# Patient Record
Sex: MALE | Race: OTHER | HISPANIC OR LATINO | Employment: FULL TIME | URBAN - NONMETROPOLITAN AREA
[De-identification: names, ages, dates, MRNs, and addresses within clinical notes are randomized per-mention and may not be internally consistent; named-entity substitution may affect disease eponyms.]

---

## 2021-10-12 ENCOUNTER — EVALUATION (OUTPATIENT)
Dept: PHYSICAL THERAPY | Facility: CLINIC | Age: 41
End: 2021-10-12
Payer: COMMERCIAL

## 2021-10-12 DIAGNOSIS — S66.320D LACERATION OF EXTENSOR MUSCLE, FASCIA AND TENDON OF RIGHT INDEX FINGER AT WRIST AND HAND LEVEL, SUBSEQUENT ENCOUNTER: Primary | ICD-10-CM

## 2021-10-12 PROCEDURE — 97112 NEUROMUSCULAR REEDUCATION: CPT | Performed by: PHYSICAL THERAPIST

## 2021-10-12 PROCEDURE — 97140 MANUAL THERAPY 1/> REGIONS: CPT | Performed by: PHYSICAL THERAPIST

## 2021-10-12 PROCEDURE — 97162 PT EVAL MOD COMPLEX 30 MIN: CPT | Performed by: PHYSICAL THERAPIST

## 2021-10-12 PROCEDURE — L3808 WHFO, RIGID W/O JOINTS: HCPCS | Performed by: PHYSICAL THERAPIST

## 2021-10-14 ENCOUNTER — APPOINTMENT (OUTPATIENT)
Dept: PHYSICAL THERAPY | Facility: CLINIC | Age: 41
End: 2021-10-14
Payer: COMMERCIAL

## 2021-10-18 ENCOUNTER — APPOINTMENT (OUTPATIENT)
Dept: PHYSICAL THERAPY | Facility: CLINIC | Age: 41
End: 2021-10-18
Payer: COMMERCIAL

## 2021-10-19 ENCOUNTER — OFFICE VISIT (OUTPATIENT)
Dept: PHYSICAL THERAPY | Facility: CLINIC | Age: 41
End: 2021-10-19
Payer: COMMERCIAL

## 2021-10-19 DIAGNOSIS — S66.320D LACERATION OF EXTENSOR MUSCLE, FASCIA AND TENDON OF RIGHT INDEX FINGER AT WRIST AND HAND LEVEL, SUBSEQUENT ENCOUNTER: Primary | ICD-10-CM

## 2021-10-19 PROCEDURE — 97110 THERAPEUTIC EXERCISES: CPT | Performed by: PHYSICAL THERAPIST

## 2021-10-19 PROCEDURE — 97112 NEUROMUSCULAR REEDUCATION: CPT | Performed by: PHYSICAL THERAPIST

## 2021-10-19 PROCEDURE — 97140 MANUAL THERAPY 1/> REGIONS: CPT | Performed by: PHYSICAL THERAPIST

## 2021-10-20 ENCOUNTER — OFFICE VISIT (OUTPATIENT)
Dept: PHYSICAL THERAPY | Facility: CLINIC | Age: 41
End: 2021-10-20
Payer: COMMERCIAL

## 2021-10-20 DIAGNOSIS — S66.320D LACERATION OF EXTENSOR MUSCLE, FASCIA AND TENDON OF RIGHT INDEX FINGER AT WRIST AND HAND LEVEL, SUBSEQUENT ENCOUNTER: Primary | ICD-10-CM

## 2021-10-20 PROCEDURE — 97112 NEUROMUSCULAR REEDUCATION: CPT | Performed by: PHYSICAL THERAPIST

## 2021-10-20 PROCEDURE — 97110 THERAPEUTIC EXERCISES: CPT | Performed by: PHYSICAL THERAPIST

## 2021-10-20 PROCEDURE — 97140 MANUAL THERAPY 1/> REGIONS: CPT | Performed by: PHYSICAL THERAPIST

## 2021-10-25 ENCOUNTER — OFFICE VISIT (OUTPATIENT)
Dept: PHYSICAL THERAPY | Facility: CLINIC | Age: 41
End: 2021-10-25
Payer: COMMERCIAL

## 2021-10-25 DIAGNOSIS — S66.320D LACERATION OF EXTENSOR MUSCLE, FASCIA AND TENDON OF RIGHT INDEX FINGER AT WRIST AND HAND LEVEL, SUBSEQUENT ENCOUNTER: Primary | ICD-10-CM

## 2021-10-25 PROCEDURE — 97110 THERAPEUTIC EXERCISES: CPT | Performed by: PHYSICAL THERAPIST

## 2021-10-25 PROCEDURE — 97140 MANUAL THERAPY 1/> REGIONS: CPT | Performed by: PHYSICAL THERAPIST

## 2021-10-25 PROCEDURE — 97112 NEUROMUSCULAR REEDUCATION: CPT | Performed by: PHYSICAL THERAPIST

## 2021-10-27 ENCOUNTER — OFFICE VISIT (OUTPATIENT)
Dept: PHYSICAL THERAPY | Facility: CLINIC | Age: 41
End: 2021-10-27
Payer: COMMERCIAL

## 2021-10-27 DIAGNOSIS — S66.320D LACERATION OF EXTENSOR MUSCLE, FASCIA AND TENDON OF RIGHT INDEX FINGER AT WRIST AND HAND LEVEL, SUBSEQUENT ENCOUNTER: Primary | ICD-10-CM

## 2021-10-27 PROCEDURE — 97140 MANUAL THERAPY 1/> REGIONS: CPT | Performed by: PHYSICAL THERAPIST

## 2021-10-27 PROCEDURE — 97110 THERAPEUTIC EXERCISES: CPT | Performed by: PHYSICAL THERAPIST

## 2021-10-27 PROCEDURE — 97112 NEUROMUSCULAR REEDUCATION: CPT | Performed by: PHYSICAL THERAPIST

## 2021-11-01 ENCOUNTER — OFFICE VISIT (OUTPATIENT)
Dept: PHYSICAL THERAPY | Facility: CLINIC | Age: 41
End: 2021-11-01
Payer: COMMERCIAL

## 2021-11-01 DIAGNOSIS — S66.320D LACERATION OF EXTENSOR MUSCLE, FASCIA AND TENDON OF RIGHT INDEX FINGER AT WRIST AND HAND LEVEL, SUBSEQUENT ENCOUNTER: Primary | ICD-10-CM

## 2021-11-01 PROCEDURE — 97110 THERAPEUTIC EXERCISES: CPT | Performed by: PHYSICAL THERAPIST

## 2021-11-01 PROCEDURE — L3933 FO W/O JOINTS CF: HCPCS | Performed by: PHYSICAL THERAPIST

## 2021-11-01 PROCEDURE — 97140 MANUAL THERAPY 1/> REGIONS: CPT | Performed by: PHYSICAL THERAPIST

## 2021-11-01 PROCEDURE — 97112 NEUROMUSCULAR REEDUCATION: CPT | Performed by: PHYSICAL THERAPIST

## 2021-11-03 ENCOUNTER — OFFICE VISIT (OUTPATIENT)
Dept: PHYSICAL THERAPY | Facility: CLINIC | Age: 41
End: 2021-11-03
Payer: COMMERCIAL

## 2021-11-03 DIAGNOSIS — S66.320D LACERATION OF EXTENSOR MUSCLE, FASCIA AND TENDON OF RIGHT INDEX FINGER AT WRIST AND HAND LEVEL, SUBSEQUENT ENCOUNTER: Primary | ICD-10-CM

## 2021-11-03 PROCEDURE — 97112 NEUROMUSCULAR REEDUCATION: CPT | Performed by: PHYSICAL THERAPIST

## 2021-11-03 PROCEDURE — 97110 THERAPEUTIC EXERCISES: CPT | Performed by: PHYSICAL THERAPIST

## 2021-11-03 PROCEDURE — 97140 MANUAL THERAPY 1/> REGIONS: CPT | Performed by: PHYSICAL THERAPIST

## 2021-11-08 ENCOUNTER — OFFICE VISIT (OUTPATIENT)
Dept: PHYSICAL THERAPY | Facility: CLINIC | Age: 41
End: 2021-11-08
Payer: COMMERCIAL

## 2021-11-08 DIAGNOSIS — S66.320D LACERATION OF EXTENSOR MUSCLE, FASCIA AND TENDON OF RIGHT INDEX FINGER AT WRIST AND HAND LEVEL, SUBSEQUENT ENCOUNTER: Primary | ICD-10-CM

## 2021-11-08 PROCEDURE — 97112 NEUROMUSCULAR REEDUCATION: CPT | Performed by: PHYSICAL THERAPIST

## 2021-11-08 PROCEDURE — 97110 THERAPEUTIC EXERCISES: CPT | Performed by: PHYSICAL THERAPIST

## 2021-11-08 PROCEDURE — 97140 MANUAL THERAPY 1/> REGIONS: CPT | Performed by: PHYSICAL THERAPIST

## 2021-11-10 ENCOUNTER — OFFICE VISIT (OUTPATIENT)
Dept: PHYSICAL THERAPY | Facility: CLINIC | Age: 41
End: 2021-11-10
Payer: COMMERCIAL

## 2021-11-10 DIAGNOSIS — S66.320D LACERATION OF EXTENSOR MUSCLE, FASCIA AND TENDON OF RIGHT INDEX FINGER AT WRIST AND HAND LEVEL, SUBSEQUENT ENCOUNTER: Primary | ICD-10-CM

## 2021-11-10 PROCEDURE — 97112 NEUROMUSCULAR REEDUCATION: CPT | Performed by: PHYSICAL THERAPIST

## 2021-11-10 PROCEDURE — 97140 MANUAL THERAPY 1/> REGIONS: CPT | Performed by: PHYSICAL THERAPIST

## 2021-11-10 PROCEDURE — 97110 THERAPEUTIC EXERCISES: CPT | Performed by: PHYSICAL THERAPIST

## 2021-11-10 PROCEDURE — 97035 APP MDLTY 1+ULTRASOUND EA 15: CPT | Performed by: PHYSICAL THERAPIST

## 2021-11-15 ENCOUNTER — OFFICE VISIT (OUTPATIENT)
Dept: PHYSICAL THERAPY | Facility: CLINIC | Age: 41
End: 2021-11-15
Payer: COMMERCIAL

## 2021-11-15 DIAGNOSIS — S66.320D LACERATION OF EXTENSOR MUSCLE, FASCIA AND TENDON OF RIGHT INDEX FINGER AT WRIST AND HAND LEVEL, SUBSEQUENT ENCOUNTER: Primary | ICD-10-CM

## 2021-11-15 PROCEDURE — 97140 MANUAL THERAPY 1/> REGIONS: CPT | Performed by: PHYSICAL THERAPIST

## 2021-11-15 PROCEDURE — 97110 THERAPEUTIC EXERCISES: CPT | Performed by: PHYSICAL THERAPIST

## 2021-11-15 PROCEDURE — 97035 APP MDLTY 1+ULTRASOUND EA 15: CPT | Performed by: PHYSICAL THERAPIST

## 2021-11-15 PROCEDURE — 97112 NEUROMUSCULAR REEDUCATION: CPT | Performed by: PHYSICAL THERAPIST

## 2021-11-17 ENCOUNTER — APPOINTMENT (OUTPATIENT)
Dept: PHYSICAL THERAPY | Facility: CLINIC | Age: 41
End: 2021-11-17
Payer: COMMERCIAL

## 2021-11-18 ENCOUNTER — OFFICE VISIT (OUTPATIENT)
Dept: PHYSICAL THERAPY | Facility: CLINIC | Age: 41
End: 2021-11-18
Payer: COMMERCIAL

## 2021-11-18 DIAGNOSIS — S66.320D LACERATION OF EXTENSOR MUSCLE, FASCIA AND TENDON OF RIGHT INDEX FINGER AT WRIST AND HAND LEVEL, SUBSEQUENT ENCOUNTER: Primary | ICD-10-CM

## 2021-11-18 PROCEDURE — 97035 APP MDLTY 1+ULTRASOUND EA 15: CPT | Performed by: PHYSICAL THERAPIST

## 2021-11-18 PROCEDURE — 97140 MANUAL THERAPY 1/> REGIONS: CPT | Performed by: PHYSICAL THERAPIST

## 2021-11-18 PROCEDURE — 97110 THERAPEUTIC EXERCISES: CPT | Performed by: PHYSICAL THERAPIST

## 2021-11-18 PROCEDURE — 97112 NEUROMUSCULAR REEDUCATION: CPT | Performed by: PHYSICAL THERAPIST

## 2021-11-22 ENCOUNTER — APPOINTMENT (OUTPATIENT)
Dept: PHYSICAL THERAPY | Facility: CLINIC | Age: 41
End: 2021-11-22
Payer: COMMERCIAL

## 2021-11-23 ENCOUNTER — OFFICE VISIT (OUTPATIENT)
Dept: PHYSICAL THERAPY | Facility: CLINIC | Age: 41
End: 2021-11-23
Payer: COMMERCIAL

## 2021-11-23 DIAGNOSIS — S66.320D LACERATION OF EXTENSOR MUSCLE, FASCIA AND TENDON OF RIGHT INDEX FINGER AT WRIST AND HAND LEVEL, SUBSEQUENT ENCOUNTER: Primary | ICD-10-CM

## 2021-11-23 PROCEDURE — 97112 NEUROMUSCULAR REEDUCATION: CPT

## 2021-11-23 PROCEDURE — 97110 THERAPEUTIC EXERCISES: CPT

## 2021-11-23 PROCEDURE — 97035 APP MDLTY 1+ULTRASOUND EA 15: CPT

## 2021-11-23 PROCEDURE — 97140 MANUAL THERAPY 1/> REGIONS: CPT

## 2021-11-24 ENCOUNTER — APPOINTMENT (OUTPATIENT)
Dept: PHYSICAL THERAPY | Facility: CLINIC | Age: 41
End: 2021-11-24
Payer: COMMERCIAL

## 2021-11-26 ENCOUNTER — OFFICE VISIT (OUTPATIENT)
Dept: PHYSICAL THERAPY | Facility: CLINIC | Age: 41
End: 2021-11-26
Payer: COMMERCIAL

## 2021-11-26 DIAGNOSIS — S66.320D LACERATION OF EXTENSOR MUSCLE, FASCIA AND TENDON OF RIGHT INDEX FINGER AT WRIST AND HAND LEVEL, SUBSEQUENT ENCOUNTER: Primary | ICD-10-CM

## 2021-11-26 PROCEDURE — 97110 THERAPEUTIC EXERCISES: CPT

## 2021-11-26 PROCEDURE — 97112 NEUROMUSCULAR REEDUCATION: CPT

## 2021-11-26 PROCEDURE — 97035 APP MDLTY 1+ULTRASOUND EA 15: CPT

## 2021-11-26 PROCEDURE — 97140 MANUAL THERAPY 1/> REGIONS: CPT

## 2021-11-29 ENCOUNTER — APPOINTMENT (OUTPATIENT)
Dept: PHYSICAL THERAPY | Facility: CLINIC | Age: 41
End: 2021-11-29
Payer: COMMERCIAL

## 2021-12-01 ENCOUNTER — OFFICE VISIT (OUTPATIENT)
Dept: PHYSICAL THERAPY | Facility: CLINIC | Age: 41
End: 2021-12-01
Payer: COMMERCIAL

## 2021-12-01 DIAGNOSIS — S66.320D LACERATION OF EXTENSOR MUSCLE, FASCIA AND TENDON OF RIGHT INDEX FINGER AT WRIST AND HAND LEVEL, SUBSEQUENT ENCOUNTER: Primary | ICD-10-CM

## 2021-12-01 PROCEDURE — 97110 THERAPEUTIC EXERCISES: CPT | Performed by: PHYSICAL THERAPIST

## 2021-12-01 PROCEDURE — 97035 APP MDLTY 1+ULTRASOUND EA 15: CPT | Performed by: PHYSICAL THERAPIST

## 2021-12-01 PROCEDURE — 97112 NEUROMUSCULAR REEDUCATION: CPT | Performed by: PHYSICAL THERAPIST

## 2021-12-01 PROCEDURE — 97140 MANUAL THERAPY 1/> REGIONS: CPT | Performed by: PHYSICAL THERAPIST

## 2021-12-06 ENCOUNTER — OFFICE VISIT (OUTPATIENT)
Dept: PHYSICAL THERAPY | Facility: CLINIC | Age: 41
End: 2021-12-06
Payer: COMMERCIAL

## 2021-12-06 DIAGNOSIS — S66.320D LACERATION OF EXTENSOR MUSCLE, FASCIA AND TENDON OF RIGHT INDEX FINGER AT WRIST AND HAND LEVEL, SUBSEQUENT ENCOUNTER: Primary | ICD-10-CM

## 2021-12-06 PROCEDURE — L3933 FO W/O JOINTS CF: HCPCS | Performed by: PHYSICAL THERAPIST

## 2021-12-06 PROCEDURE — 97110 THERAPEUTIC EXERCISES: CPT | Performed by: PHYSICAL THERAPIST

## 2021-12-06 PROCEDURE — 97112 NEUROMUSCULAR REEDUCATION: CPT | Performed by: PHYSICAL THERAPIST

## 2021-12-06 PROCEDURE — 97140 MANUAL THERAPY 1/> REGIONS: CPT | Performed by: PHYSICAL THERAPIST

## 2021-12-08 ENCOUNTER — APPOINTMENT (OUTPATIENT)
Dept: PHYSICAL THERAPY | Facility: CLINIC | Age: 41
End: 2021-12-08
Payer: COMMERCIAL

## 2021-12-10 ENCOUNTER — OFFICE VISIT (OUTPATIENT)
Dept: PHYSICAL THERAPY | Facility: CLINIC | Age: 41
End: 2021-12-10
Payer: COMMERCIAL

## 2021-12-10 DIAGNOSIS — S66.320D LACERATION OF EXTENSOR MUSCLE, FASCIA AND TENDON OF RIGHT INDEX FINGER AT WRIST AND HAND LEVEL, SUBSEQUENT ENCOUNTER: Primary | ICD-10-CM

## 2021-12-10 PROCEDURE — 97110 THERAPEUTIC EXERCISES: CPT

## 2021-12-10 PROCEDURE — 97140 MANUAL THERAPY 1/> REGIONS: CPT

## 2021-12-10 PROCEDURE — 97112 NEUROMUSCULAR REEDUCATION: CPT

## 2021-12-13 ENCOUNTER — OFFICE VISIT (OUTPATIENT)
Dept: PHYSICAL THERAPY | Facility: CLINIC | Age: 41
End: 2021-12-13
Payer: COMMERCIAL

## 2021-12-13 DIAGNOSIS — S66.320D LACERATION OF EXTENSOR MUSCLE, FASCIA AND TENDON OF RIGHT INDEX FINGER AT WRIST AND HAND LEVEL, SUBSEQUENT ENCOUNTER: Primary | ICD-10-CM

## 2021-12-13 PROCEDURE — 97140 MANUAL THERAPY 1/> REGIONS: CPT | Performed by: PHYSICAL THERAPIST

## 2021-12-13 PROCEDURE — 97112 NEUROMUSCULAR REEDUCATION: CPT | Performed by: PHYSICAL THERAPIST

## 2021-12-13 PROCEDURE — 97110 THERAPEUTIC EXERCISES: CPT | Performed by: PHYSICAL THERAPIST

## 2021-12-15 ENCOUNTER — OFFICE VISIT (OUTPATIENT)
Dept: PHYSICAL THERAPY | Facility: CLINIC | Age: 41
End: 2021-12-15
Payer: COMMERCIAL

## 2021-12-15 ENCOUNTER — APPOINTMENT (OUTPATIENT)
Dept: PHYSICAL THERAPY | Facility: CLINIC | Age: 41
End: 2021-12-15
Payer: COMMERCIAL

## 2021-12-15 DIAGNOSIS — S66.320D LACERATION OF EXTENSOR MUSCLE, FASCIA AND TENDON OF RIGHT INDEX FINGER AT WRIST AND HAND LEVEL, SUBSEQUENT ENCOUNTER: Primary | ICD-10-CM

## 2021-12-15 PROCEDURE — 97140 MANUAL THERAPY 1/> REGIONS: CPT

## 2021-12-15 PROCEDURE — 97110 THERAPEUTIC EXERCISES: CPT

## 2021-12-15 PROCEDURE — 97112 NEUROMUSCULAR REEDUCATION: CPT

## 2021-12-16 ENCOUNTER — APPOINTMENT (OUTPATIENT)
Dept: PHYSICAL THERAPY | Facility: CLINIC | Age: 41
End: 2021-12-16
Payer: COMMERCIAL

## 2021-12-20 ENCOUNTER — APPOINTMENT (OUTPATIENT)
Dept: PHYSICAL THERAPY | Facility: CLINIC | Age: 41
End: 2021-12-20
Payer: COMMERCIAL

## 2021-12-21 ENCOUNTER — OFFICE VISIT (OUTPATIENT)
Dept: PHYSICAL THERAPY | Facility: CLINIC | Age: 41
End: 2021-12-21
Payer: COMMERCIAL

## 2021-12-21 DIAGNOSIS — S66.320D LACERATION OF EXTENSOR MUSCLE, FASCIA AND TENDON OF RIGHT INDEX FINGER AT WRIST AND HAND LEVEL, SUBSEQUENT ENCOUNTER: Primary | ICD-10-CM

## 2021-12-21 PROCEDURE — 97110 THERAPEUTIC EXERCISES: CPT | Performed by: PHYSICAL THERAPIST

## 2021-12-21 PROCEDURE — 97112 NEUROMUSCULAR REEDUCATION: CPT | Performed by: PHYSICAL THERAPIST

## 2021-12-21 PROCEDURE — 97140 MANUAL THERAPY 1/> REGIONS: CPT | Performed by: PHYSICAL THERAPIST

## 2021-12-21 PROCEDURE — L3933 FO W/O JOINTS CF: HCPCS | Performed by: PHYSICAL THERAPIST

## 2021-12-22 ENCOUNTER — APPOINTMENT (OUTPATIENT)
Dept: PHYSICAL THERAPY | Facility: CLINIC | Age: 41
End: 2021-12-22
Payer: COMMERCIAL

## 2021-12-27 ENCOUNTER — OFFICE VISIT (OUTPATIENT)
Dept: PHYSICAL THERAPY | Facility: CLINIC | Age: 41
End: 2021-12-27
Payer: COMMERCIAL

## 2021-12-27 DIAGNOSIS — S66.320D LACERATION OF EXTENSOR MUSCLE, FASCIA AND TENDON OF RIGHT INDEX FINGER AT WRIST AND HAND LEVEL, SUBSEQUENT ENCOUNTER: Primary | ICD-10-CM

## 2021-12-27 PROCEDURE — 97140 MANUAL THERAPY 1/> REGIONS: CPT

## 2021-12-27 PROCEDURE — 97112 NEUROMUSCULAR REEDUCATION: CPT

## 2021-12-27 PROCEDURE — 97110 THERAPEUTIC EXERCISES: CPT

## 2021-12-29 ENCOUNTER — APPOINTMENT (OUTPATIENT)
Dept: PHYSICAL THERAPY | Facility: CLINIC | Age: 41
End: 2021-12-29
Payer: COMMERCIAL

## 2021-12-30 ENCOUNTER — OFFICE VISIT (OUTPATIENT)
Dept: PHYSICAL THERAPY | Facility: CLINIC | Age: 41
End: 2021-12-30
Payer: COMMERCIAL

## 2021-12-30 DIAGNOSIS — S66.320D LACERATION OF EXTENSOR MUSCLE, FASCIA AND TENDON OF RIGHT INDEX FINGER AT WRIST AND HAND LEVEL, SUBSEQUENT ENCOUNTER: Primary | ICD-10-CM

## 2021-12-30 PROCEDURE — 97110 THERAPEUTIC EXERCISES: CPT | Performed by: PHYSICAL THERAPIST

## 2021-12-30 PROCEDURE — 97112 NEUROMUSCULAR REEDUCATION: CPT | Performed by: PHYSICAL THERAPIST

## 2021-12-30 PROCEDURE — 97140 MANUAL THERAPY 1/> REGIONS: CPT | Performed by: PHYSICAL THERAPIST

## 2022-01-03 ENCOUNTER — APPOINTMENT (OUTPATIENT)
Dept: PHYSICAL THERAPY | Facility: CLINIC | Age: 42
End: 2022-01-03
Payer: COMMERCIAL

## 2022-01-05 ENCOUNTER — APPOINTMENT (OUTPATIENT)
Dept: PHYSICAL THERAPY | Facility: CLINIC | Age: 42
End: 2022-01-05
Payer: COMMERCIAL

## 2022-01-07 ENCOUNTER — OFFICE VISIT (OUTPATIENT)
Dept: PHYSICAL THERAPY | Facility: CLINIC | Age: 42
End: 2022-01-07
Payer: COMMERCIAL

## 2022-01-07 DIAGNOSIS — S66.320D LACERATION OF EXTENSOR MUSCLE, FASCIA AND TENDON OF RIGHT INDEX FINGER AT WRIST AND HAND LEVEL, SUBSEQUENT ENCOUNTER: Primary | ICD-10-CM

## 2022-01-07 PROCEDURE — 97140 MANUAL THERAPY 1/> REGIONS: CPT

## 2022-01-07 PROCEDURE — 97035 APP MDLTY 1+ULTRASOUND EA 15: CPT

## 2022-01-07 PROCEDURE — 97112 NEUROMUSCULAR REEDUCATION: CPT

## 2022-01-07 PROCEDURE — 97110 THERAPEUTIC EXERCISES: CPT

## 2022-01-07 NOTE — PROGRESS NOTES
Daily Note     Today's date: 2022  Patient name: Jerome Tatum  : 1980  MRN: [id number]  Referring provider: Gino Soto MD  Dx:   Encounter Diagnosis     ICD-10-CM    1  Laceration of extensor muscle, fascia and tendon of right index finger at wrist and hand level, subsequent encounter  S66 560D                   Subjective: Patient reports he will RTD this month  He feels he is happy with progress and has no new complaints  Objective: See treatment diary below  AROM IF MP- 0/90; PIP- 15/95; DIP- 0/55; -1 5 cm DPC             MF MP- 0/95; PIP- 0/105; DIP- 0/65   II- 110/130; tip- l 3JC- ; Key-         Assessment: Tolerated treatment well  Patient exhibited good technique with therapeutic exercises and would benefit from continued PT to improve strength and mobility in PIP and 3rd digit  No changes in pain t/o session  Plan: Continue per plan of care        Precautions: none       Manuals 1/7 11/26 12/1  12/6  12/10  12/13  12/15  12/21  12/27  12/30   STM 15 15 15 15 15 15 15  15 15  15   WHFO       PIP u               PIP ext str    MAG                 Serial cast              MAG        PIP gutter       MAG                                      Neuro Re-Ed                      HP/pulsed biph 15 15 15 15 15 15 15  15  15  15                                                                                                                                             Ther Ex                                                                                            TP  T 2'  T2'  T 2'  T 2'  T2' T 2'  T 2'  T 2'  T 2'  T 2'    TP scrape F/E T   T1  T   T '  T   T   T /  T   T   T     TP 5 finger T 2'  T 10  T 2/10  T 2'  T2'  T 2'  T 2'  T 2'  T 2'  T 2'    digiflex-F B 2/10  Y 10  Y 2/10  R 2/10  R 2/10  R 210  R 2/10  R 2/10  G /10  B 10    Thumbciser E/F 100% 2/10  2/10  2/10  50% /10  50% 2/10  50% 2/10  50% 2/10  100% 2/10  100% 2/10  2/10    Flex bar N 2/10      G 20/20  G 2/10 G 2/10  G 2/10  G 2/10  G 2/10  B 2/10    Benny 50 2/10      25 2/10  25 2/10  25 2/10  25 2/10  25 2/10  35 2/10  50 2/10    Blue VI 2/10      IV 2/10  IV 2/10  IV 2/10  IV 2/10  IV  V 2/10  VI 2/10    Comp  Y 3'            Y 3'  Y 3'  Y 3'  Y 3'                                                 Modalities

## 2022-01-31 NOTE — PROGRESS NOTES
PT Discharge    Today's date: 2022  Patient name: Fuad Gandhi  : 1980  MRN: [id number]  Referring provider: Agapito Thorne MD  Dx:   Encounter Diagnosis     ICD-10-CM    1  Laceration of extensor muscle, fascia and tendon of right index finger at wrist and hand level, subsequent encounter  S66 320D        Start Time: 0915  Stop Time: 1022  Total time in clinic (min): 67 minutes    Assessment  Assessment details: Pt is a 45 YO male presenting to PT with pain, decreased AROM, strength and tolerance to activity  Pt would benefit from skilled intervention to address these issues and maximize overall function  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   Dominant- right; Involved- right  Pt is in week 12 following extensor tendon repair of the IF  Pt has been progressing steadily with increased AROM and strengtheningl  He has discontinued  his yoke splint   Pt is unable to attend therapy  Transition to a HEP    Goals  ST  Decrease pain to 0-2/10 in 4 weeks            2  Decrease swelling right hand and wrist            3  Increase AROM to [facility] in 8-10 weeks            4  Maintain clean wound and promote healing            5   Provide orthotic for protection  LT  Increase functional motion and strength for independence with ADL and self care by DC            2   Ability to RTW and recreational activity by DC  Goals met    Plan  Patient would benefit from: skilled physical therapy  Planned modality interventions: cryotherapy, ultrasound and thermotherapy: hydrocollator packs  Planned therapy interventions: activity modification, manual therapy, neuromuscular re-education, strengthening, stretching, therapeutic activities, therapeutic exercise and home exercise program  Frequency: 2x week  Duration in weeks: 4  Treatment plan discussed with: patient        Subjective Evaluation    History of Present Illness  Date of onset: 2021  Date of surgery: 10/7/2021  Mechanism of injury: Pt injured at work, cutting dorsal hand on dryer vent during delivery   Pain  Current pain ratin  At best pain ratin  At worst pain ratin  Location: dorsal hand    Hand dominance: right    Treatments  Current treatment: physical therapy  Patient Goals  Patient goals for therapy: decreased edema, decreased pain, increased motion, increased strength, independence with ADLs/IADLs, return to sport/leisure activities and return to work          Objective     General Comments:      Wrist/Hand Comments  Pt progressing with AROM, decreased swelling and increased functional strength  AROM IF MP- 0/90; PIP- 15/95; DIP- 0/55; -1 5 cm [address]             MF MP- 0/95; PIP- 0/105;  DIP- 0/65   II- 110/130; tip- 12l 3JC- ; Key-   Pt notes tension across the PIP with composite flexion       Flowsheet Rows      Most Recent Value   PT/OT G-Codes    Current Score 76   Projected Score 61             Precautions: none       Manuals 11/18 11/26 12/1  12/6  12/10  12/13  12/15  12/21  12/27  12/30   STM 15 15 15 15 15 15 15  15 15  15   WHFO right      PIP u               PIP ext str     MAG                 Serial cast               MAG        PIP gutter        MAG                                       Neuro Re-Ed                       HP/pulsed biph 15 15 15 15 15 15 15  15  15  15                                                                                                                                                   Ther Ex                                                                                                TP   Y 2'  T2'  T 2'  T 2'  T2' T 2'  T 2'  T 2'  T 2'  T 2'    TP scrape F/E  Y   T1  T   T '  T   T   T /  T   T   T     TP 5 finger  Y 10  T 2/10  T 2/10  T 2'  T2'  T 2'  T 2'  T 2'  T 2'  T 2'    digiflex-F  Blue /10  Y 10  Y 2/10  R 2/10  R 2/10  R 2/10  R 10  R 10  G 10  B 2/10    Thumbciser E/F 2/10  2/10  2/10  50% 2/10  50% 2/10  50% 2/10  50% 2/10  100% 2/10  100% 2/10  2/10    Flex bar  B      G 20/20  G 2/10 G 2/10  G 2/10  G 2/10  G 2/10  B 2/10    Benny  50      25 2/10  25 2/10  25 2/10  25 2/10  25 2/10  35 2/10  50 2/10    Blue  VI      IV 2/10  IV 2/10  IV 2/10  IV 2/10  IV  V 2/10  VI 2/10    Comp               Y 3'  Y 3'  Y 3'  Y 3'                                                   Modalities

## 2023-08-11 ENCOUNTER — APPOINTMENT (OUTPATIENT)
Dept: URGENT CARE | Facility: CLINIC | Age: 43
End: 2023-08-11
Payer: OTHER MISCELLANEOUS

## 2023-08-11 PROCEDURE — 99283 EMERGENCY DEPT VISIT LOW MDM: CPT | Performed by: PHYSICIAN ASSISTANT

## 2023-08-11 PROCEDURE — G0382 LEV 3 HOSP TYPE B ED VISIT: HCPCS | Performed by: PHYSICIAN ASSISTANT

## 2023-08-13 ENCOUNTER — APPOINTMENT (OUTPATIENT)
Dept: URGENT CARE | Facility: CLINIC | Age: 43
End: 2023-08-13
Payer: OTHER MISCELLANEOUS

## 2023-08-13 PROCEDURE — 99213 OFFICE O/P EST LOW 20 MIN: CPT | Performed by: PHYSICIAN ASSISTANT

## 2023-08-18 ENCOUNTER — APPOINTMENT (OUTPATIENT)
Dept: URGENT CARE | Facility: CLINIC | Age: 43
End: 2023-08-18
Payer: OTHER MISCELLANEOUS

## 2023-08-18 PROCEDURE — 99213 OFFICE O/P EST LOW 20 MIN: CPT

## 2023-08-24 ENCOUNTER — APPOINTMENT (OUTPATIENT)
Dept: URGENT CARE | Facility: CLINIC | Age: 43
End: 2023-08-24
Payer: OTHER MISCELLANEOUS

## 2023-08-24 PROCEDURE — 99213 OFFICE O/P EST LOW 20 MIN: CPT

## 2023-12-06 ENCOUNTER — APPOINTMENT (OUTPATIENT)
Dept: RADIOLOGY | Facility: CLINIC | Age: 43
End: 2023-12-06
Payer: OTHER MISCELLANEOUS

## 2023-12-06 ENCOUNTER — OCCMED (OUTPATIENT)
Dept: URGENT CARE | Facility: CLINIC | Age: 43
End: 2023-12-06
Payer: OTHER MISCELLANEOUS

## 2023-12-06 DIAGNOSIS — S69.92XA INJURY OF LEFT HAND, INITIAL ENCOUNTER: ICD-10-CM

## 2023-12-06 DIAGNOSIS — S69.92XA INJURY OF LEFT HAND, INITIAL ENCOUNTER: Primary | ICD-10-CM

## 2023-12-06 PROCEDURE — G0383 LEV 4 HOSP TYPE B ED VISIT: HCPCS

## 2023-12-06 PROCEDURE — 73130 X-RAY EXAM OF HAND: CPT

## 2023-12-06 PROCEDURE — 99284 EMERGENCY DEPT VISIT MOD MDM: CPT

## 2025-05-08 ENCOUNTER — OCCMED (OUTPATIENT)
Dept: URGENT CARE | Facility: CLINIC | Age: 45
End: 2025-05-08
Payer: OTHER MISCELLANEOUS

## 2025-05-08 ENCOUNTER — APPOINTMENT (OUTPATIENT)
Dept: RADIOLOGY | Facility: CLINIC | Age: 45
End: 2025-05-08
Attending: PHYSICIAN ASSISTANT
Payer: OTHER MISCELLANEOUS

## 2025-05-08 DIAGNOSIS — Z02.6 ENCOUNTER RELATED TO WORKER'S COMPENSATION CLAIM: ICD-10-CM

## 2025-05-08 DIAGNOSIS — Z02.6 ENCOUNTER RELATED TO WORKER'S COMPENSATION CLAIM: Primary | ICD-10-CM

## 2025-05-08 PROCEDURE — 99283 EMERGENCY DEPT VISIT LOW MDM: CPT | Performed by: PHYSICIAN ASSISTANT

## 2025-05-08 PROCEDURE — 73630 X-RAY EXAM OF FOOT: CPT

## 2025-05-08 PROCEDURE — G0382 LEV 3 HOSP TYPE B ED VISIT: HCPCS | Performed by: PHYSICIAN ASSISTANT

## 2025-05-22 ENCOUNTER — TELEPHONE (OUTPATIENT)
Age: 45
End: 2025-05-22

## 2025-05-22 NOTE — TELEPHONE ENCOUNTER
Hello,    Please advise if a forced appointment can be accommodated for the patient:    Call back #: [phone]    Insurance: work comp     Reason for appointment: fracture of right foot     Requested doctor and/or location: [place] / Patient is [place] speaking       Thank you.

## 2025-06-10 ENCOUNTER — OFFICE VISIT (OUTPATIENT)
Dept: PODIATRY | Facility: CLINIC | Age: 45
End: 2025-06-10
Payer: OTHER MISCELLANEOUS

## 2025-06-10 VITALS — BODY MASS INDEX: 36.32 KG/M2 | HEIGHT: 66 IN | WEIGHT: 226 LBS

## 2025-06-10 DIAGNOSIS — S93.601A SPRAIN OF RIGHT FOOT, INITIAL ENCOUNTER: Primary | ICD-10-CM

## 2025-06-10 PROCEDURE — 99243 OFF/OP CNSLTJ NEW/EST LOW 30: CPT | Performed by: PODIATRIST

## 2025-06-10 RX ORDER — MELOXICAM 15 MG/1
15 TABLET ORAL DAILY
Qty: 30 TABLET | Refills: 1 | Status: SHIPPED | OUTPATIENT
Start: 2025-06-10

## 2025-06-10 RX ORDER — IBUPROFEN 800 MG/1
800 TABLET, FILM COATED ORAL EVERY 8 HOURS PRN
COMMUNITY
Start: 2025-01-30

## 2025-06-10 NOTE — PROGRESS NOTES
"Name: Yves Staley      : 1980      MRN: [id number]  Encounter Provider: Saravanan Durham DPM  Encounter Date: 6/10/2025   Encounter department: [facility] [place]  :  Assessment & Plan  Sprain of right foot, initial encounter  Exacerbated midfoot spurring with the foot sprain  RICE protocol  Topical and oral NSAID can be used as needed.   Check in 4 weeks if still symptomatic  Orders:  •  Diclofenac Sodium (VOLTAREN) 1 %; Apply 2 g topically 4 (four) times a day  •  meloxicam (Mobic) 15 mg tablet; Take 1 tablet (15 mg total) by mouth daily    XRay 3 views of the right foot personally read by Dr. Durham in office today and discussed with patient:    There is no acute fracture or dislocation.  No significant degenerative changes.  No lytic or blastic osseous lesion.  Soft tissues are unremarkable.      History of Present Illness   HPI  Yves Staley is a 45 y.o. male who presents with right foot pain. He had an XR. It began about a week ago. He's a  and very hard on his feet.       Review of Systems       Objective   Ht 5' 6\" (1.676 m)   Wt 103 kg (226 lb)   BMI 36.48 kg/m²      Physical Exam    Cardiovascular:      Pulses:           Dorsalis pedis pulses are 2+ on the right side.     Musculoskeletal:         General: Tenderness present.      Right foot: Normal range of motion. Deformity (TMTJ spurring TTP) present.        Feet:            "

## 2025-07-22 ENCOUNTER — OFFICE VISIT (OUTPATIENT)
Dept: PODIATRY | Facility: CLINIC | Age: 45
End: 2025-07-22
Payer: OTHER MISCELLANEOUS

## 2025-07-22 DIAGNOSIS — S93.601A SPRAIN OF RIGHT FOOT, INITIAL ENCOUNTER: Primary | ICD-10-CM

## 2025-07-22 PROCEDURE — 99213 OFFICE O/P EST LOW 20 MIN: CPT | Performed by: PODIATRIST

## 2025-07-22 NOTE — LETTER
July 22, 2025     Patient: Yves Staley  YOB: 1980  Date of Visit: 7/22/2025      To Whom it May Concern:    Yves Staley is under my professional care. Yves was seen in my office on 7/22/2025. Yves is healed from his foot injury. Released to full duty, no restirctions beginning tomorrow, 7/23/2025..    If you have any questions or concerns, please don't hesitate to call.         Sincerely,          Saravanan Durham DPM        CC: No Recipients

## 2025-07-22 NOTE — PROGRESS NOTES
Name: Yves Staley      : 1980      MRN: [id number]  Encounter Provider: Saravanan Durham DPM  Encounter Date: 2025   Encounter department: [facility] [place]  :  Assessment & Plan  Sprain of right foot, initial encounter  No pain or instability on exam. Can return to work full duty. Note given .Reappoint as needed           History of Present Illness   HPI  Yves Staley is a 45 y.o. male who presents f/u from foot injury/sprain. He says he has no pain, no issues.       Review of Systems       Objective   There were no vitals taken for this visit.     Physical Exam  Vitals reviewed.   Constitutional:       General: He is not in acute distress.    Cardiovascular:      Rate and Rhythm: Normal rate.      Pulses: Normal pulses.   Pulmonary:      Effort: Pulmonary effort is normal. No respiratory distress.     Musculoskeletal:         General: No tenderness, deformity or signs of injury.     Skin:     Capillary Refill: Capillary refill takes less than 2 seconds.      Findings: No bruising or erythema.     Neurological:      Mental Status: He is alert and oriented to person, place, and time.      Sensory: No sensory deficit.      Gait: Gait normal.     No right foot instability. Normal midfoot and rearfoot ROM. NOrmal MMT.